# Patient Record
Sex: FEMALE | Race: WHITE | NOT HISPANIC OR LATINO | Employment: FULL TIME | URBAN - METROPOLITAN AREA
[De-identification: names, ages, dates, MRNs, and addresses within clinical notes are randomized per-mention and may not be internally consistent; named-entity substitution may affect disease eponyms.]

---

## 2017-01-01 ENCOUNTER — HOSPITAL ENCOUNTER (EMERGENCY)
Facility: HOSPITAL | Age: 37
Discharge: HOME | End: 2017-01-01
Attending: EMERGENCY MEDICINE
Payer: MEDICAID

## 2017-01-01 VITALS
DIASTOLIC BLOOD PRESSURE: 75 MMHG | TEMPERATURE: 98 F | HEIGHT: 68 IN | RESPIRATION RATE: 20 BRPM | SYSTOLIC BLOOD PRESSURE: 121 MMHG | WEIGHT: 264 LBS | OXYGEN SATURATION: 98 % | HEART RATE: 86 BPM | BODY MASS INDEX: 40.01 KG/M2

## 2017-01-01 DIAGNOSIS — M54.9 CVA TENDERNESS: ICD-10-CM

## 2017-01-01 DIAGNOSIS — Z87.440 HISTORY OF UTI: ICD-10-CM

## 2017-01-01 DIAGNOSIS — R10.9 ACUTE RIGHT FLANK PAIN: Primary | ICD-10-CM

## 2017-01-01 PROBLEM — E55.9 UNSPECIFIED VITAMIN D DEFICIENCY: Status: ACTIVE | Noted: 2017-01-01

## 2017-01-01 LAB
ALBUMIN SERPL BCP-MCNC: 3.8 G/DL
ALP SERPL-CCNC: 88 U/L
ALT SERPL W/O P-5'-P-CCNC: 18 U/L
ANION GAP SERPL CALC-SCNC: 13 MMOL/L
AST SERPL-CCNC: 17 U/L
BASOPHILS # BLD AUTO: 0.02 K/UL
BASOPHILS NFR BLD: 0.2 %
BILIRUB SERPL-MCNC: 0.3 MG/DL
BILIRUB UR QL STRIP: NEGATIVE
BUN SERPL-MCNC: 14 MG/DL
CALCIUM SERPL-MCNC: 9.4 MG/DL
CHLORIDE SERPL-SCNC: 103 MMOL/L
CLARITY UR REFRACT.AUTO: CLEAR
CO2 SERPL-SCNC: 24 MMOL/L
COLOR UR AUTO: YELLOW
CREAT SERPL-MCNC: 0.8 MG/DL
DIFFERENTIAL METHOD: ABNORMAL
EOSINOPHIL # BLD AUTO: 0.2 K/UL
EOSINOPHIL NFR BLD: 1.7 %
ERYTHROCYTE [DISTWIDTH] IN BLOOD BY AUTOMATED COUNT: 14.7 %
EST. GFR  (AFRICAN AMERICAN): >60 ML/MIN/1.73 M^2
EST. GFR  (NON AFRICAN AMERICAN): >60 ML/MIN/1.73 M^2
GLUCOSE SERPL-MCNC: 95 MG/DL
GLUCOSE UR QL STRIP: NEGATIVE
HCT VFR BLD AUTO: 44.9 %
HGB BLD-MCNC: 14.7 G/DL
HGB UR QL STRIP: NEGATIVE
KETONES UR QL STRIP: NEGATIVE
LEUKOCYTE ESTERASE UR QL STRIP: NEGATIVE
LYMPHOCYTES # BLD AUTO: 1.8 K/UL
LYMPHOCYTES NFR BLD: 19.3 %
MCH RBC QN AUTO: 28.1 PG
MCHC RBC AUTO-ENTMCNC: 32.7 %
MCV RBC AUTO: 86 FL
MONOCYTES # BLD AUTO: 0.6 K/UL
MONOCYTES NFR BLD: 6.7 %
NEUTROPHILS # BLD AUTO: 6.6 K/UL
NEUTROPHILS NFR BLD: 72 %
NITRITE UR QL STRIP: NEGATIVE
PH UR STRIP: 6 [PH] (ref 5–8)
PLATELET # BLD AUTO: 234 K/UL
PMV BLD AUTO: 12.2 FL
POTASSIUM SERPL-SCNC: 4.3 MMOL/L
PROT SERPL-MCNC: 7.5 G/DL
PROT UR QL STRIP: NEGATIVE
RBC # BLD AUTO: 5.23 M/UL
SODIUM SERPL-SCNC: 140 MMOL/L
SP GR UR STRIP: 1.02 (ref 1–1.03)
URN SPEC COLLECT METH UR: NORMAL
UROBILINOGEN UR STRIP-ACNC: NEGATIVE EU/DL
WBC # BLD AUTO: 9.09 K/UL

## 2017-01-01 PROCEDURE — 81003 URINALYSIS AUTO W/O SCOPE: CPT

## 2017-01-01 PROCEDURE — 63600175 PHARM REV CODE 636 W HCPCS: Performed by: NURSE PRACTITIONER

## 2017-01-01 PROCEDURE — 80053 COMPREHEN METABOLIC PANEL: CPT

## 2017-01-01 PROCEDURE — 99284 EMERGENCY DEPT VISIT MOD MDM: CPT | Mod: 25

## 2017-01-01 PROCEDURE — 96375 TX/PRO/DX INJ NEW DRUG ADDON: CPT

## 2017-01-01 PROCEDURE — 85025 COMPLETE CBC W/AUTO DIFF WBC: CPT

## 2017-01-01 PROCEDURE — 96374 THER/PROPH/DIAG INJ IV PUSH: CPT

## 2017-01-01 RX ORDER — ONDANSETRON 2 MG/ML
4 INJECTION INTRAMUSCULAR; INTRAVENOUS
Status: COMPLETED | OUTPATIENT
Start: 2017-01-01 | End: 2017-01-01

## 2017-01-01 RX ORDER — NAPROXEN 500 MG/1
500 TABLET ORAL 2 TIMES DAILY WITH MEALS
Qty: 30 TABLET | Refills: 0 | Status: SHIPPED | OUTPATIENT
Start: 2017-01-01 | End: 2021-01-13 | Stop reason: SDUPTHER

## 2017-01-01 RX ORDER — ONDANSETRON 8 MG/1
8 TABLET, ORALLY DISINTEGRATING ORAL
COMMUNITY
Start: 2016-12-29 | End: 2017-12-29

## 2017-01-01 RX ORDER — KETOROLAC TROMETHAMINE 30 MG/ML
30 INJECTION, SOLUTION INTRAMUSCULAR; INTRAVENOUS
Status: COMPLETED | OUTPATIENT
Start: 2017-01-01 | End: 2017-01-01

## 2017-01-01 RX ORDER — NITROFURANTOIN 25; 75 MG/1; MG/1
100 CAPSULE ORAL
COMMUNITY
Start: 2016-12-29 | End: 2017-01-05

## 2017-01-01 RX ADMIN — ONDANSETRON 4 MG: 2 INJECTION INTRAMUSCULAR; INTRAVENOUS at 03:01

## 2017-01-01 RX ADMIN — KETOROLAC TROMETHAMINE 30 MG: 30 INJECTION, SOLUTION INTRAMUSCULAR at 03:01

## 2017-01-01 NOTE — DISCHARGE INSTRUCTIONS
Flank Pain, Uncertain Cause  The flank is the area between your upper abdomen and your back. Pain there is often caused by a problem with your kidneys. It might be a kidney infection or a kidney stone. Other causes of flank pain include spinal arthritis, a pinched nerve from a back injury, or a back muscle strain or spasm.  The cause of your flank pain is not certain. You may need other tests.  Home care  Follow these tips when caring for yourself at home:  · You may use acetaminophen or ibuprofen to control pain, unless your health care provider prescribed another medicine Note: If you have chronic liver or kidney disease, talk with your provider before taking these medicines. Also talk with your provider first if youve had a stomach ulcer or GI bleeding.  · If the cause of your pain is coming from the muscles, you may get relief with ice or heat: Ice: During the first 2 days after the injury, put an ice pack on the painful area for 20 minutes every 2 to 4 hours. This will reduce swelling and pain. A hot shower, hot bath, or heating pad works well for muscle spasm. You can start with ice, then switch to heat after 2 days. You might find that alternating ice and heat works well. Use the method that feels the best to you.  Follow-up care  Follow up with your health care provider. if your symptoms dont get better over the next few days.  When to seek medical advice  Call your health care provider right away if any of these happen:  · Repeated vomiting  · Fever of 100.4ºF (38ºC) or higher, or as directed by your health care provider  · Flank pain that gets worse  · Pain that spreads to the front of your belly (abdomen)  · Dizziness, weakness, or fainting  · Blood in your urine  · Burning feeling when you urinate or the need to urinate often  · Pain in one of your legs that gets worse  · Numbness or weakness in a leg  © 0347-8033 [employer]. [address] [place] [address], [place] [address]. All rights  reserved. This information is not intended as a substitute for professional medical care. Always follow your healthcare professional's instructions.

## 2017-01-01 NOTE — ED AVS SNAPSHOT
[facility]  [address] [address] [address]               Juanita Tran   2017  2:13 PM   ED    Description:  Female : 1980   Department:  [facility]           Your Care was Coordinated By:     Provider Role From To    Dioni Kevin NP Nurse Practitioner 17 1414 --      Reason for Visit     Flank Pain           Diagnoses this Visit        Comments    Acute right flank pain    -  Primary     History of UTI     Currently being treated with Macrobid    CVA tenderness           ED Disposition     ED Disposition Condition Comment    Discharge             To Do List           Follow-up Information     Schedule an appointment as soon as possible for a visit with SILVA Lind.    Specialty:  Family Medicine    Contact information:    [address]  [facility] [place] [address] [phone]         These Medications        Disp Refills Start End    naproxen (NAPROSYN) 500 MG tablet 30 tablet 0 2017     Take 1 tablet (500 mg total) by mouth 2 (two) times daily with meals. - Oral    Pharmacy: [facility] [id number] [place] [address] [facility] [address] [facility] [address] Ph #: [phone]         [facility]     [facility] -  Assistance  Registered nurses in the [facility] provide clinical advisement, health education, appointment booking, and other advisory services.  Call for this free service at [phone].             Medications           Message regarding Medications     Verify the changes and/or additions to your medication regime listed below are the same as discussed with your clinician today.  If any of these changes or additions are incorrect, please notify your healthcare provider.        START taking these NEW medications        Refills    naproxen (NAPROSYN) 500 MG tablet 0    Sig: Take 1 tablet (500 mg total) by mouth  "2 (two) times daily with meals.    Class: Print    Route: Oral      These medications were administered today        Dose Freq    ketorolac injection 30 mg 30 mg ED 1 Time    Sig: Inject 30 mg into the vein ED 1 Time.    Class: Normal    Route: Intravenous    ondansetron injection 4 mg 4 mg ED 1 Time    Sig: Inject 4 mg into the vein ED 1 Time.    Class: Normal    Route: Intravenous           Verify that the below list of medications is an accurate representation of the medications you are currently taking.  If none reported, the list may be blank. If incorrect, please contact your healthcare provider. Carry this list with you in case of emergency.           Current Medications     cyclobenzaprine (FLEXERIL) 10 MG tablet Take 10 mg by mouth 3 (three) times daily as needed for Muscle spasms.    ergocalciferol (ERGOCALCIFEROL) 50,000 unit Cap Take 50,000 Units by mouth every 7 days.    folic acid (FOLVITE) 1 MG tablet Take 1 mg by mouth once daily.    metformin (GLUCOPHAGE XR) 500 MG 24 hr tablet Take 1 po q d with evening meal for 2 weeks then 2 po q d with meal    nitrofurantoin, macrocrystal-monohydrate, (MACROBID) 100 MG capsule Take 100 mg by mouth.    omeprazole (PRILOSEC) 40 MG capsule TK 1 C PO DAILY  WITH MEALS    ondansetron (ZOFRAN-ODT) 8 MG TbDL Take 8 mg by mouth.    ranitidine (ZANTAC) 300 MG tablet Take 300 mg by mouth every evening.    SAVELLA 12.5 mg (5)-25 mg(8)-50 mg(42) DsPk TAKE AS DIRECTRED    tobramycin-dexamethasone 0.3-0.1% (TOBRADEX) 0.3-0.1 % DrpS INSTILL 1 DROP IN THE RIGHT EYE 4 TIMES A DAY    naproxen (NAPROSYN) 500 MG tablet Take 1 tablet (500 mg total) by mouth 2 (two) times daily with meals.           Clinical Reference Information           Your Vitals Were     BP Pulse Temp Resp Height Weight    153/96 (BP Location: Left arm, Patient Position: Sitting) 99 98.7 °F (37.1 °C) (Oral) 20 5' 8" (1.727 m) 119.7 kg (264 lb)    Last Period SpO2 BMI          12/22/2016 (Exact Date) 97% 40.14 " kg/m2        Allergies as of 1/1/2017        Reactions    Azithromycin Swelling    Sulfa (Sulfonamide Antibiotics) Swelling    Imitrex [Sumatriptan]     Penicillins     Prednisone       Immunizations Administered on Date of Encounter - 1/1/2017     None      ED Micro, Lab, POCT     Start Ordered       Status Ordering Provider    01/01/17 1509 01/01/17 1509  CBC auto differential  STAT      Final result     01/01/17 1509 01/01/17 1509  Comprehensive metabolic panel  STAT      Final result     01/01/17 1448 01/01/17 1447  Urinalysis  STAT      Final result       ED Imaging Orders     Start Ordered       Status Ordering Provider    01/01/17 1510 01/01/17 1509  CT Renal Stone Study ABD Pelvis WO  1 time imaging      Final result         Discharge Instructions         Flank Pain, Uncertain Cause  The flank is the area between your upper abdomen and your back. Pain there is often caused by a problem with your kidneys. It might be a kidney infection or a kidney stone. Other causes of flank pain include spinal arthritis, a pinched nerve from a back injury, or a back muscle strain or spasm.  The cause of your flank pain is not certain. You may need other tests.  Home care  Follow these tips when caring for yourself at home:  · You may use acetaminophen or ibuprofen to control pain, unless your health care provider prescribed another medicine Note: If you have chronic liver or kidney disease, talk with your provider before taking these medicines. Also talk with your provider first if youve had a stomach ulcer or GI bleeding.  · If the cause of your pain is coming from the muscles, you may get relief with ice or heat: Ice: During the first 2 days after the injury, put an ice pack on the painful area for 20 minutes every 2 to 4 hours. This will reduce swelling and pain. A hot shower, hot bath, or heating pad works well for muscle spasm. You can start with ice, then switch to heat after 2 days. You might find that alternating  ice and heat works well. Use the method that feels the best to you.  Follow-up care  Follow up with your health care provider. if your symptoms dont get better over the next few days.  When to seek medical advice  Call your health care provider right away if any of these happen:  · Repeated vomiting  · Fever of 100.4ºF (38ºC) or higher, or as directed by your health care provider  · Flank pain that gets worse  · Pain that spreads to the front of your belly (abdomen)  · Dizziness, weakness, or fainting  · Blood in your urine  · Burning feeling when you urinate or the need to urinate often  · Pain in one of your legs that gets worse  · Numbness or weakness in a leg  © 5338-4338 [employer]. [address] [place] [address], [place] [address]. All rights reserved. This information is not intended as a substitute for professional medical care. Always follow your healthcare professional's instructions.          Your Scheduled Appointments     Jan 16, 2017  9:00 AM CST   Established Patient Visit with Kofi Nuno MD   [facility] ([address] [address] [phone]              Smoking Cessation     If you would like to quit smoking:   You may be eligible for free services if you are a [place] resident and started smoking cigarettes before September 1, 1988.  Call the Smoking Cessation Trust (SCT) toll free at (664) 381-6089 or (142) 312-6822.   Call 1-800-QUIT-NOW if you do not meet the above criteria.             [facility] complies with applicable Federal civil rights laws and does not discriminate on the basis of race, color, national origin, age, disability, or sex.        Language Assistance Services     ATTENTION: Language assistance services are available, free of charge. Please call [phone].      ATENCIÓN: Si habla daleañol, tiene a de dios disposición servicios gratuitos de asistencia lingüística. Llame al [phone].     CHÚ Ý:  N?u b?n nói Ti?ng Vi?t, có các d?ch v? h? tr? ngôn ng? mi?n phí clayh cho b?n. G?i s? [phone].

## 2017-01-01 NOTE — ED PROVIDER NOTES
"Encounter Date: 1/1/2017       History     Chief Complaint   Patient presents with    Flank Pain     right. dx with kidney infection on thursday by Dr Mccartney. pain worse      Review of patient's allergies indicates:   Allergen Reactions    Azithromycin Swelling    Sulfa (sulfonamide antibiotics) Swelling    Imitrex [sumatriptan]     Penicillins     Prednisone      Patient is a 36 y.o. female presenting with the following complaint: abdominal pain. The history is provided by the patient.   Abdominal Pain   The current episode started several days ago. The problem has been gradually worsening. The abdominal pain is located in the right flank. The abdominal pain does not radiate. The severity of the abdominal pain is 6/10. The abdominal pain is relieved by certain positions. The other symptoms of the illness do not include fever, fatigue, jaundice, shortness of breath, nausea, vomiting, diarrhea or dysuria.   Additional symptoms associated with the illness include frequency (currently being treated for a bladder infection) and back pain. Symptoms associated with the illness do not include chills or anorexia. Significant associated medical issues include GERD. Significant associated medical issues do not include gallstones, liver disease or cardiac disease.   Ms. Tran presents to the emergency department with complaints of pain to her right flank region.  She states that she was seen by her primary care provider, Anson Awad MD, on Thursday and diagnosed with a "kidney infection" and prescribed Macrobid.  The patient states that her pain has since worsened since beginning the antibiotics.  She denies any nausea, vomiting, diarrhea, or any further complaints.         PCP:   SILVA Lind        Past Medical History   Diagnosis Date    Environmental allergies     Fibromyalgia     GERD (gastroesophageal reflux disease)     Insulin resistance     Osteoarthritis     Sciatica      Past Medical " History Pertinent Negatives   Diagnosis Date Noted    Abnormal Pap smear of cervix 12/19/2016     Past Surgical History   Procedure Laterality Date    [place] tooth extraction      Ectopic pregnancy surgery       Family History   Problem Relation Age of Onset    Thrombosis Maternal Grandmother      DVT    Colon cancer Maternal Grandfather     Thrombosis Mother      DVT    Breast cancer Neg Hx     Ovarian cancer Neg Hx      Social History   Substance Use Topics    Smoking status: Current Every Day Smoker     Packs/day: 1.00     Years: 20.00     Types: Cigarettes    Smokeless tobacco: Never Used    Alcohol use Yes      Comment: rarely     Review of Systems   Constitutional: Negative for chills, fatigue and fever.   HENT: Negative for sore throat.    Eyes: Negative for visual disturbance.   Respiratory: Negative for cough, chest tightness, shortness of breath and wheezing.    Cardiovascular: Negative for chest pain.   Gastrointestinal: Positive for abdominal pain. Negative for anorexia, diarrhea, jaundice, nausea and vomiting.   Genitourinary: Positive for flank pain (right) and frequency (currently being treated for a bladder infection). Negative for dysuria.   Musculoskeletal: Positive for back pain.   Skin: Negative for rash.   Neurological: Negative for dizziness, weakness and headaches.   Hematological: Does not bruise/bleed easily.       Physical Exam   Initial Vitals   BP Pulse Resp Temp SpO2   01/01/17 1410 01/01/17 1410 01/01/17 1410 01/01/17 1410 01/01/17 1410   153/96 99 20 98.7 °F (37.1 °C) 97 %     Physical Exam    Nursing note and vitals reviewed.  Constitutional: She appears well-developed and well-nourished. She is Obese . She is cooperative. She does not appear ill. No distress.   HENT:   Head: Normocephalic and atraumatic.   Nose: Nose normal.   Mouth/Throat: Uvula is midline, oropharynx is clear and moist and mucous membranes are normal.   Eyes: Conjunctivae, EOM and lids are normal. Pupils  are equal, round, and reactive to light.   Neck: Trachea normal and normal range of motion. Neck supple.   Cardiovascular: Normal rate, regular rhythm, intact distal pulses and normal pulses.   Pulmonary/Chest: Effort normal and breath sounds normal. No respiratory distress. She has no wheezes. She has no rhonchi. She has no rales.   Abdominal: Soft. Bowel sounds are normal. She exhibits no distension and no mass. There is tenderness (right flank) in the right lower quadrant. There is CVA tenderness (right). There is no rebound and no guarding.   Musculoskeletal: Normal range of motion. She exhibits no edema or tenderness.   Neurological: She is alert and oriented to person, place, and time. She has normal strength. No cranial nerve deficit or sensory deficit. GCS eye subscore is 4. GCS verbal subscore is 5. GCS motor subscore is 6.   Neurovascular intact to all extremities. Normal gait.    Skin: Skin is warm, dry and intact. No rash noted.   Normal color and turgor.    Psychiatric: She has a normal mood and affect. Her speech is normal and behavior is normal. Judgment and thought content normal. Cognition and memory are normal.         ED Course   Procedures     ED Abnormal Lab Results:   Labs Reviewed   CBC W/ AUTO DIFFERENTIAL - Abnormal; Notable for the following:        Result Value    RDW 14.7 (*)     All other components within normal limits   URINALYSIS   COMPREHENSIVE METABOLIC PANEL       ED Lab Results:   Results for orders placed or performed during the hospital encounter of 01/01/17   Urinalysis   Result Value Ref Range    Specimen UA Urine, Clean Catch     Color, UA Yellow Yellow, Straw, [place]    Appearance, UA Clear Clear    pH, UA 6.0 5.0 - 8.0    Specific Gravity, UA 1.025 1.005 - 1.030    Protein, UA Negative Negative    Glucose, UA Negative Negative    Ketones, UA Negative Negative    Bilirubin (UA) Negative Negative    Occult Blood UA Negative Negative    Nitrite, UA Negative Negative     Urobilinogen, UA Negative <2.0 EU/dL    Leukocytes, UA Negative Negative   CBC auto differential   Result Value Ref Range    WBC 9.09 3.90 - 12.70 K/uL    RBC 5.23 4.00 - 5.40 M/uL    Hemoglobin 14.7 12.0 - 16.0 g/dL    Hematocrit 44.9 37.0 - 48.5 %    MCV 86 82 - 98 fL    MCH 28.1 27.0 - 31.0 pg    MCHC 32.7 32.0 - 36.0 %    RDW 14.7 (H) 11.5 - 14.5 %    Platelets 234 150 - 350 K/uL    MPV 12.2 9.2 - 12.9 fL    Gran # 6.6 1.8 - 7.7 K/uL    Lymph # 1.8 1.0 - 4.8 K/uL    Mono # 0.6 0.3 - 1.0 K/uL    Eos # 0.2 0.0 - 0.5 K/uL    Baso # 0.02 0.00 - 0.20 K/uL    Gran% 72.0 38.0 - 73.0 %    Lymph% 19.3 18.0 - 48.0 %    Mono% 6.7 4.0 - 15.0 %    Eosinophil% 1.7 0.0 - 8.0 %    Basophil% 0.2 0.0 - 1.9 %    Differential Method Automated    Comprehensive metabolic panel   Result Value Ref Range    Sodium 140 136 - 145 mmol/L    Potassium 4.3 3.5 - 5.1 mmol/L    Chloride 103 95 - 110 mmol/L    CO2 24 23 - 29 mmol/L    Glucose 95 70 - 110 mg/dL    BUN, Bld 14 6 - 20 mg/dL    Creatinine 0.8 0.5 - 1.4 mg/dL    Calcium 9.4 8.7 - 10.5 mg/dL    Total Protein 7.5 6.0 - 8.4 g/dL    Albumin 3.8 3.5 - 5.2 g/dL    Total Bilirubin 0.3 0.1 - 1.0 mg/dL    Alkaline Phosphatase 88 55 - 135 U/L    AST 17 10 - 40 U/L    ALT 18 10 - 44 U/L    Anion Gap 13 8 - 16 mmol/L    eGFR if African American >60.0 >60 mL/min/1.73 m^2    eGFR if non African American >60.0 >60 mL/min/1.73 m^2         ED Imaging Results:   Imaging Results         CT Renal Stone Study ABD Pelvis WO (Final result) Result time:  01/01/17 15:53:37    Final result by Willian Tomlinson MD (01/01/17 15:53:37)    Impression:        Negative for acute inflammatory process of the abdomen or pelvis.      All CT scans at this facility use dose modulation, iterative reconstruction and/or weight based dosing when appropriate to reduced radiation dose to as low as reasonably achievable.        Electronically signed by: WILLIAN TOMLINSON MD  Date:     01/01/17  Time:    15:53      "Narrative:    The EXAM:CT RENAL STONE STUDY ABD PELVIS WO 01/01/17 15:39:00      HISTORY: Right flank pain.    TECHNIQUE: Standard axial imaging performed with reformatted sagittal and coronal images.       COMPARISON: None      FINDINGS:    Lung bases are clear.    The liver is normal.  Gallbladder is normal.    The spleen, pancreas and adrenal glands are normal.  Aorta and IVC are normal in appearance.    The stomach and small intestine are normal.  The appendix is normal.  The colon is normal.  The rectum is normal.    Uterus is unremarkable.  Adnexal regions are normal.    Regional bones are unremarkable.                ED Medications:   Medications   ketorolac injection 30 mg (30 mg Intravenous Given 1/1/17 1519)   ondansetron injection 4 mg (4 mg Intravenous Given 1/1/17 1548)       ED Vital Signs:  Vitals:    01/01/17 1410 01/01/17 1613   BP: (!) 153/96 121/75   Pulse: 99 86   Resp: 20    Temp: 98.7 °F (37.1 °C) 98.2 °F (36.8 °C)   TempSrc: Oral Oral   SpO2: 97% 98%   Weight: 119.7 kg (264 lb)    Height: 5' 8" (1.727 m)        1610 HOURS RE-EVALUATION & DISPOSITION:   Reassessment at the time of disposition demonstrates that the patient is resting comfortably in no acute distress. Patient denies any nausea at this time and rates her pain as a 4/10.  She has remained hemodynamically stable throughout the entire ED visit and is without objective evidence for acute process requiring urgent intervention or hospitalization. I discussed test results and provided counseling to patient with regard to condition, the treatment plan, specific conditions for return, and the importance of follow up.  Answered questions at this time. The patient is stable for discharge.  I recommended that the patient follow up with her primary care provider this week and schedule a routine OB/GYN exam for further evaluation and treatment.             Medical Decision Making:   History:   Old Records Summarized: records from clinic " visits.  Clinical Tests:   Lab Tests: Ordered and Reviewed  Radiological Study: Ordered and Reviewed                     Clinical Impression:       ICD-10-CM ICD-9-CM   1. Acute right flank pain R10.11 789.09     338.19   2. History of UTI (Currently being treated with Macrobid) Z87.440 V13.02   3. CVA tenderness M54.9 724.5         Disposition:   Disposition: Discharged  Condition: Stable  I discussed with patient that the evaluation in the emergency department does not suggest any emergent or life threatening medical condition requiring immediate intervention beyond what was provided in the ED, and I believe patient is safe for discharge.  Regardless, an unremarkable evaluation in the ED does not preclude the development or presence of a serious of life threatening condition. As such, patient was instructed to return immediately for any worsening or change in current symptoms. I also discussed the results of my evaluation and diagnosis with patient and she concurs with the evaluation and management plan.  Detailed written and verbal instructions provided to patient and she expressed a verbal understanding of the discharge instructions and overall management plan. Reiterated the importance of medication administration and safety and advised patient to follow up with primary care provider in 3-5 days or sooner if needed.  Also advised patient to return to the ER for any complications.     Regarding FLANK PAIN, I recommended that the patient: Sip water or other clear fluids; avoid solid food for the first few hours after vomiting or diarrhea; if vomiting, wait 6 hours, and then eat small amounts of mild foods such as rice, applesauce, or crackers; avoid dairy products; avoid citrus, high-fat foods, fried or greasy foods, tomato products, caffeine, alcohol, and carbonated beverages;  avoid aspirin, ibuprofen or other anti-inflammatory medications, and narcotic pain medications unless prescribed.  In regards to  prevention, I encouraged patient to:  Avoid fatty or greasy foods; drink plenty of water each day; eat small meals more frequently; exercise regularly; limit foods that produce gas; make sure meals are well-balanced and high in fiber and include plenty of fruits and vegetables.      Discharge Medication List as of 1/1/2017  4:09 PM      START taking these medications    Details   naproxen (NAPROSYN) 500 MG tablet Take 1 tablet (500 mg total) by mouth 2 (two) times daily with meals., Starting 1/1/2017, Until Discontinued, Print             Follow-up Information     Schedule an appointment as soon as possible for a visit with SILVA Lind.    Specialty:  Family Medicine    Contact information:    [address]  [facility] [place] [address] [phone]               Dioni Kevin NP  01/03/17 1795

## 2019-12-08 ENCOUNTER — HOSPITAL ENCOUNTER (EMERGENCY)
Facility: HOSPITAL | Age: 39
Discharge: HOME | End: 2019-12-08
Attending: EMERGENCY MEDICINE
Payer: MEDICAID

## 2019-12-08 VITALS
BODY MASS INDEX: 38.08 KG/M2 | DIASTOLIC BLOOD PRESSURE: 91 MMHG | WEIGHT: 250.44 LBS | TEMPERATURE: 98 F | RESPIRATION RATE: 19 BRPM | SYSTOLIC BLOOD PRESSURE: 132 MMHG | OXYGEN SATURATION: 98 % | HEART RATE: 105 BPM

## 2019-12-08 DIAGNOSIS — L03.115 CELLULITIS OF RIGHT LOWER EXTREMITY: Primary | ICD-10-CM

## 2019-12-08 PROCEDURE — 25000003 PHARM REV CODE 250: Mod: ER | Performed by: EMERGENCY MEDICINE

## 2019-12-08 PROCEDURE — 99283 EMERGENCY DEPT VISIT LOW MDM: CPT | Mod: ER

## 2019-12-08 RX ORDER — CLINDAMYCIN HYDROCHLORIDE 150 MG/1
300 CAPSULE ORAL
Status: COMPLETED | OUTPATIENT
Start: 2019-12-08 | End: 2019-12-08

## 2019-12-08 RX ORDER — CLINDAMYCIN HYDROCHLORIDE 150 MG/1
300 CAPSULE ORAL 4 TIMES DAILY
Qty: 56 CAPSULE | Refills: 0 | Status: SHIPPED | OUTPATIENT
Start: 2019-12-08 | End: 2019-12-15

## 2019-12-08 RX ORDER — CLINDAMYCIN HYDROCHLORIDE 150 MG/1
300 CAPSULE ORAL 4 TIMES DAILY
Qty: 56 CAPSULE | Refills: 0 | Status: SHIPPED | OUTPATIENT
Start: 2019-12-08 | End: 2019-12-08 | Stop reason: SDUPTHER

## 2019-12-08 RX ADMIN — CLINDAMYCIN HYDROCHLORIDE 300 MG: 150 CAPSULE ORAL at 12:12

## 2019-12-08 NOTE — ED NOTES
LOC: The patient is awake, alert and aware of environment with an appropriate affect, the patient is oriented x 3 and speaking appropriately.  APPEARANCE: Patient resting comfortably and in no acute distress, patient is clean and well groomed, patient's clothing is properly fastened.  HEENT: Brief WNL  SKIN: Brief WNL except redness to inner groin area  MUSCULOSKELETAL: Brief WNL  RESPIRATORY: Brief WNL  CARDIAC: Brief WNL  GASTRO: Brief WNL  : Brief WNL  Peripheral Vasc: Brief WNL  NEURO: Brief WNL  PSYCH: Brief WNL

## 2019-12-09 NOTE — ED PROVIDER NOTES
Encounter Date: 12/7/2019       History     Chief Complaint   Patient presents with    Abscess     c/o abscess on right inner leg onset yesterday     Patient presents for evaluation of a cutaneous infection. Lesion is located in the medial right thigh. Onset was noted about a week ago. Symptoms have gradually worsened. Patient does not have previous history of cutaneous abscesses. Patient does not have diabetes.        Review of patient's allergies indicates:   Allergen Reactions    Azithromycin Swelling    Sulfa (sulfonamide antibiotics) Swelling    Imitrex [sumatriptan]     Penicillins     Prednisone      Past Medical History:   Diagnosis Date    Environmental allergies     Fibromyalgia     GERD (gastroesophageal reflux disease)     Insulin resistance     Osteoarthritis     Rheumatoid arteritis     Sciatica      Past Surgical History:   Procedure Laterality Date    ECTOPIC PREGNANCY SURGERY      WISDOM TOOTH EXTRACTION       Family History   Problem Relation Age of Onset    Thrombosis Maternal Grandmother         DVT    Colon cancer Maternal Grandfather     Thrombosis Mother         DVT    Breast cancer Neg Hx     Ovarian cancer Neg Hx      Social History     Tobacco Use    Smoking status: Current Every Day Smoker     Packs/day: 1.00     Years: 20.00     Pack years: 20.00     Types: Cigarettes    Smokeless tobacco: Never Used   Substance Use Topics    Alcohol use: Yes     Comment: rarely    Drug use: No     Review of Systems   Constitutional: Negative for chills and fever.   HENT: Negative for congestion and rhinorrhea.    Respiratory: Negative for cough, chest tightness, shortness of breath and wheezing.    Cardiovascular: Negative for chest pain, palpitations and leg swelling.   Gastrointestinal: Negative for abdominal pain, constipation, diarrhea, nausea and vomiting.   Genitourinary: Negative for dysuria, frequency, urgency, vaginal bleeding and vaginal discharge.   Skin: Negative for  color change and rash.   Allergic/Immunologic: Negative for immunocompromised state.   Neurological: Negative for dizziness, weakness and numbness.   Hematological: Negative for adenopathy. Does not bruise/bleed easily.   All other systems reviewed and are negative.      Physical Exam     Initial Vitals [12/08/19 0004]   BP Pulse Resp Temp SpO2   (!) 132/91 105 19 98.3 °F (36.8 °C) 98 %      MAP       --         Physical Exam    Nursing note and vitals reviewed.  Constitutional: She appears well-developed and well-nourished. She is not diaphoretic. No distress.   HENT:   Head: Normocephalic and atraumatic.   Cardiovascular: Normal rate, regular rhythm, normal heart sounds and intact distal pulses.   Pulmonary/Chest: Breath sounds normal. No respiratory distress.   Neurological: She is alert and oriented to person, place, and time. She has normal strength.   Skin: Skin is warm and dry.        Bedside US fails to identify any signs of a fluid collection         ED Course   Procedures  Labs Reviewed - No data to display       Imaging Results    None          Medical Decision Making:   ED Management:  All findings were reviewed with the patient/family in detail.  I see no indication of an emergent process beyond that addressed during our encounter but have duly counseled the patient/family regarding the need for prompt follow-up as well as the indications that should prompt immediate return to the emergency room should new or worrisome developments occur.  The patient has additionally been provided with printed information regarding diagnosis as well as instructions regarding follow up and any medications intended to manage the patient's aforementioned conditions.  The patient/family communicates understanding of all this information and all remaining questions and concerns were addressed at this time.                                       Clinical Impression:       ICD-10-CM ICD-9-CM   1. Cellulitis of right lower  extremity L03.115 682.6                             Constantine Jimenez MD  12/09/19 0239

## 2021-01-13 ENCOUNTER — HOSPITAL ENCOUNTER (EMERGENCY)
Facility: HOSPITAL | Age: 41
Discharge: HOME | End: 2021-01-13
Attending: EMERGENCY MEDICINE
Payer: MEDICAID

## 2021-01-13 VITALS
RESPIRATION RATE: 19 BRPM | TEMPERATURE: 99 F | SYSTOLIC BLOOD PRESSURE: 134 MMHG | BODY MASS INDEX: 37.25 KG/M2 | OXYGEN SATURATION: 96 % | DIASTOLIC BLOOD PRESSURE: 88 MMHG | WEIGHT: 245 LBS | HEART RATE: 85 BPM

## 2021-01-13 DIAGNOSIS — S80.01XA CONTUSION OF RIGHT KNEE, INITIAL ENCOUNTER: ICD-10-CM
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
## 2021-01-13 DIAGNOSIS — S93.402A SPRAIN OF LEFT ANKLE, UNSPECIFIED LIGAMENT, INITIAL ENCOUNTER: Primary | ICD-10-CM

## 2021-01-13 PROCEDURE — 99284 EMERGENCY DEPT VISIT MOD MDM: CPT | Mod: 25,ER

## 2021-01-13 RX ORDER — ORPHENADRINE CITRATE 100 MG/1
100 TABLET, EXTENDED RELEASE ORAL 2 TIMES DAILY
Qty: 12 TABLET | Refills: 0 | Status: SHIPPED | OUTPATIENT
Start: 2021-01-13

## 2021-01-13 RX ORDER — NAPROXEN 500 MG/1
500 TABLET ORAL 2 TIMES DAILY WITH MEALS
Qty: 12 TABLET | Refills: 0 | OUTPATIENT
Start: 2021-01-13 | End: 2022-05-18

## 2021-04-28 ENCOUNTER — PATIENT MESSAGE (OUTPATIENT)
Dept: RESEARCH | Facility: HOSPITAL | Age: 41
End: 2021-04-28

## 2022-01-29 ENCOUNTER — HOSPITAL ENCOUNTER (EMERGENCY)
Facility: HOSPITAL | Age: 42
Discharge: HOME | End: 2022-01-29
Attending: EMERGENCY MEDICINE
Payer: MEDICAID

## 2022-01-29 VITALS
BODY MASS INDEX: 41.9 KG/M2 | OXYGEN SATURATION: 98 % | DIASTOLIC BLOOD PRESSURE: 93 MMHG | WEIGHT: 275.56 LBS | HEART RATE: 102 BPM | RESPIRATION RATE: 16 BRPM | SYSTOLIC BLOOD PRESSURE: 163 MMHG | TEMPERATURE: 99 F

## 2022-01-29 DIAGNOSIS — U07.1 COVID-19 VIRUS DETECTED: Primary | ICD-10-CM

## 2022-01-29 LAB
CTP QC/QA: YES
CTP QC/QA: YES
POC MOLECULAR INFLUENZA A AGN: NEGATIVE
POC MOLECULAR INFLUENZA B AGN: NEGATIVE
SARS-COV-2 RDRP RESP QL NAA+PROBE: POSITIVE

## 2022-01-29 PROCEDURE — 99282 EMERGENCY DEPT VISIT SF MDM: CPT | Mod: 25,ER

## 2022-01-29 PROCEDURE — U0002 COVID-19 LAB TEST NON-CDC: HCPCS | Mod: ER | Performed by: NURSE PRACTITIONER

## 2022-01-29 NOTE — ED PROVIDER NOTES
Encounter Date: 1/29/2022       History     Chief Complaint   Patient presents with    bodyaches     Bodyaches, headache since Thursday.      Juanita Tran is a 41 y.o. female with medical history of hypertension,fibromyalgia, rheumatoid arthritis, osteoarthritis, insulin resistance, sciatica presents to ED with complaint of body aches over the past 3 days. She noticed mild elevation of bp on thrusday at onset of body aches Thursday. She has covid concerns and would like testing. No fever, no increased cough, patient is cigarette smoker, has same occasional cough. No wheezing. No shortness of breath.           Review of patient's allergies indicates:   Allergen Reactions    Azithromycin Swelling    Sulfa (sulfonamide antibiotics) Swelling    Imitrex [sumatriptan]     Penicillins     Prednisone      Past Medical History:   Diagnosis Date    Environmental allergies     Fibromyalgia     GERD (gastroesophageal reflux disease)     Insulin resistance     Osteoarthritis     Rheumatoid arteritis     Sciatica      Past Surgical History:   Procedure Laterality Date    ECTOPIC PREGNANCY SURGERY      WISDOM TOOTH EXTRACTION       Family History   Problem Relation Age of Onset    Thrombosis Maternal Grandmother         DVT    Colon cancer Maternal Grandfather     Thrombosis Mother         DVT    Breast cancer Neg Hx     Ovarian cancer Neg Hx      Social History     Tobacco Use    Smoking status: Current Every Day Smoker     Packs/day: 1.00     Years: 20.00     Pack years: 20.00     Types: Cigarettes    Smokeless tobacco: Never Used   Substance Use Topics    Alcohol use: Yes     Comment: rarely    Drug use: No     Review of Systems   Constitutional: Positive for activity change, appetite change, chills and fatigue. Negative for fever.   HENT: Negative for congestion, postnasal drip, rhinorrhea, sinus pressure, sinus pain, sneezing, sore throat and voice change.    Eyes: Negative.    Respiratory: Negative  for cough, choking, chest tightness, shortness of breath and wheezing.    Cardiovascular: Negative for chest pain, palpitations and leg swelling.   Gastrointestinal: Negative for abdominal distention, abdominal pain, constipation, diarrhea, nausea and vomiting.   Endocrine: Negative for cold intolerance and heat intolerance.   Genitourinary: Negative.    Musculoskeletal: Positive for myalgias. Negative for arthralgias, back pain, gait problem, joint swelling, neck pain and neck stiffness.   Skin: Negative.    Allergic/Immunologic: Negative for environmental allergies and food allergies.   Neurological: Positive for headaches. Negative for dizziness, facial asymmetry and light-headedness.   All other systems reviewed and are negative.      Physical Exam     Initial Vitals [01/29/22 1232]   BP Pulse Resp Temp SpO2   (!) 163/93 102 16 99.2 °F (37.3 °C) 98 %      MAP       --         Physical Exam    Nursing note and vitals reviewed.  Constitutional: She appears well-developed and well-nourished. She is not diaphoretic. She is Obese . She is active and cooperative.  Non-toxic appearance. She does not have a sickly appearance. She does not appear ill. No distress.   HENT:   Head: Normocephalic and atraumatic.   Right Ear: External ear normal.   Left Ear: External ear normal.   Nose: Nose normal.   Mouth/Throat: Oropharynx is clear and moist. No oropharyngeal exudate.   Eyes: Conjunctivae are normal.   Neck: Neck supple.   Normal range of motion.  Cardiovascular: Normal rate, regular rhythm, normal heart sounds and intact distal pulses.   Pulmonary/Chest: Breath sounds normal. No respiratory distress. She has no wheezes. She has no rhonchi. She has no rales.   Abdominal: Abdomen is soft.   Musculoskeletal:         General: Normal range of motion.      Cervical back: Normal range of motion and neck supple.     Neurological: She is alert and oriented to person, place, and time. She has normal strength and normal reflexes.    Skin: Skin is warm and dry. Capillary refill takes less than 2 seconds.   Psychiatric: She has a normal mood and affect. Her behavior is normal. Thought content normal.         ED Course   Procedures  Labs Reviewed   SARS-COV-2 RDRP GENE - Abnormal; Notable for the following components:       Result Value    POC Rapid COVID Positive (*)     All other components within normal limits   POCT INFLUENZA A/B MOLECULAR          Imaging Results    None          Medications - No data to display              ED Course as of 01/29/22 1426   Sat Jan 29, 2022   1358 Covid risk score: 1    2:00 PM Reassessed patient at this time.  Pt states condition has improved. Discussed with pt all pertinent ED information and results. Discussed pt dx and plan of tx. Gave patient or family all follow up and return to the ED instructions. All questions and concerns were addressed at this time. Patient or family express understanding of information and instructions, and is comfortable with plan to discharge. Patient is stable for discharge.         [CG]      ED Course User Index  [CG] Kaylah Reynolds NP             Clinical Impression:   Final diagnoses:  [U07.1] COVID-19 virus detected (Primary)          ED Disposition Condition    Discharge Stable        ED Prescriptions     None        Follow-up Information     Follow up With Specialties Details Why Contact Info    [facility]  As needed [address]  [facility] [place] [address] [phone]      [facility] - Emergency Dept Emergency Medicine  If symptoms worsen [address]  [facility] [address] [phone]           Kaylah Reynolds NP  01/29/22 1426

## 2022-01-29 NOTE — DISCHARGE INSTRUCTIONS
Your test was POSITIVE for COVID-19 (coronavirus).       Please isolate yourself at home over next 5 days.  You may leave home and/or return to work once the following conditions are met:    If you were not hospitalized and are not moderately to severely immunocompromised:   More than 5 days since symptoms first appeared AND  More than 24 hours fever free without medications AND  Symptoms are improving  Continue to wear a mask around others for 5 additional days.    If you were hospitalized OR are moderately to severely immunocompromised:  More than 20 days since symptoms first appeared  More than 24 hours fever free without medications  Symptoms have improved    If you had no symptoms but tested positive:  More than 5 days since the date of the first positive test (20 days if moderately to severely immunocompromised). If you develop symptoms, then use the guidelines above.  Continue to wear a mask around others for 5 additional days.      Contact Tracing    As one of the next steps, you will receive a call or text from the [place] Department of Health ([facility]) COVID-19 Tracing Team. See the contact information below so you know not to ignore the health departments call. It is important that you contact them back immediately so they can help.      Contact Tracer Number:  [phone]  Caller ID for most carriers: [facility]     What is contact tracing?  Contact tracing is a process that helps identify everyone who has been in close contact with an infected person. Contact tracers let those people know they may have been exposed and guide them on next steps. Confidentiality is important for everyone; no one will be told who may have exposed them to the virus.  Your involvement is important. The more we know about where and how this virus is spreading, the better chance we have at stopping it from spreading further.  What does exposure mean?  Exposure means you have been within 6 feet for more than 15 minutes  with a person who has or had COVID-19.  What kind of questions do the contact tracers ask?  A contact tracer will confirm your basic contact information including name, address, phone number, and next of kin, as well as asking about any symptoms you may have had. Theyll also ask you how you think you may have gotten sick, such as places where you may have been exposed to the virus, and people you were with. Those names will never be shared with anyone outside of that call, and will only be used to help trace and stop the spread of the virus.   I have privacy concerns. How will the state use my information?  Your privacy about your health is important. All calls are completed using call centers that use the appropriate health privacy protection measures (HIPAA compliance), meaning that your patient information is safe. No one will ever ask you any questions related to immigration status. Your health comes first.   Do I have to participate?  You do not have to participate, but we strongly encourage you to. Contact tracing can help us catch and control new outbreaks as theyre developing to keep your friends and family safe.   What if I dont hear from anyone?  If you dont receive a call within 24 hours, you can call the number above right away to inquire about your status. That line is open from 8:00 am - 8:00 p.m., 7 days a week.  Contact tracing saves lives! Together, we have the power to beat this virus and keep our loved ones and neighbors safe.    For more information see CDC link below.      https://www.cdc.gov/coronavirus/2019-ncov/hcp/guidance-prevent-spread.html#precautions        Sources:  CDC, [place] Department [facility] and [facility]           Sincerely,     Kaylah Reynolds NP

## 2022-01-29 NOTE — Clinical Note
"Juanita Tran (Misty) was seen and treated in our emergency department on 1/29/2022.     COVID-19 is present in our communities across the state. There is limited testing for COVID at this time, so not all patients can be tested. In this situation, your employee meets the following criteria:    Juanita Tran has met the criteria for COVID-19 testing and has a POSITIVE result. She can return to work once they are asymptomatic for 24 hours without the use of fever reducing medications AND at least five days from the first positive result. A mask is recommended for 5 days post quarantine.     If you have any questions or concerns, or if I can be of further assistance, please do not hesitate to contact me.    Sincerely,             Kaylah Reynolds NP"

## 2022-02-06 ENCOUNTER — HOSPITAL ENCOUNTER (EMERGENCY)
Facility: HOSPITAL | Age: 42
Discharge: HOME | End: 2022-02-06
Attending: EMERGENCY MEDICINE
Payer: MEDICAID

## 2022-02-06 VITALS
WEIGHT: 271.19 LBS | OXYGEN SATURATION: 95 % | RESPIRATION RATE: 21 BRPM | TEMPERATURE: 99 F | HEIGHT: 68 IN | BODY MASS INDEX: 41.1 KG/M2 | SYSTOLIC BLOOD PRESSURE: 142 MMHG | HEART RATE: 97 BPM | DIASTOLIC BLOOD PRESSURE: 90 MMHG

## 2022-02-06 DIAGNOSIS — U07.1 COVID-19: ICD-10-CM

## 2022-02-06 DIAGNOSIS — R05.9 COUGH: Primary | ICD-10-CM

## 2022-02-06 DIAGNOSIS — R06.02 SHORTNESS OF BREATH: ICD-10-CM

## 2022-02-06 PROCEDURE — 99284 EMERGENCY DEPT VISIT MOD MDM: CPT | Mod: 25,ER

## 2022-02-06 RX ORDER — ALBUTEROL SULFATE 90 UG/1
1-2 AEROSOL, METERED RESPIRATORY (INHALATION) EVERY 6 HOURS PRN
Qty: 8 G | Refills: 0 | Status: SHIPPED | OUTPATIENT
Start: 2022-02-06 | End: 2023-02-06

## 2022-02-06 RX ORDER — IBUPROFEN 800 MG/1
800 TABLET ORAL EVERY 6 HOURS PRN
Qty: 20 TABLET | Refills: 0 | Status: SHIPPED | OUTPATIENT
Start: 2022-02-06

## 2022-02-06 RX ORDER — CODEINE PHOSPHATE AND GUAIFENESIN 10; 100 MG/5ML; MG/5ML
5 SOLUTION ORAL 3 TIMES DAILY PRN
Qty: 180 ML | Refills: 0 | Status: SHIPPED | OUTPATIENT
Start: 2022-02-06 | End: 2022-02-16

## 2022-02-06 NOTE — Clinical Note
"Juanita Tran (Misty) was seen and treated in our emergency department on 2/6/2022.     COVID-19 is present in our communities across the state. There is limited testing for COVID at this time, so not all patients can be tested. In this situation, your employee meets the following criteria:    Juanita Tran has met the criteria for COVID-19 testing and has a POSITIVE result. She can return to work once they are asymptomatic for 24 hours without the use of fever reducing medications AND at least five days from the first positive result. A mask is recommended for 5 days post quarantine.     If you have any questions or concerns, or if I can be of further assistance, please do not hesitate to contact me.    Sincerely,             Bud Ray NP"

## 2022-02-07 NOTE — ED PROVIDER NOTES
Encounter Date: 2/6/2022       History     Chief Complaint   Patient presents with    COVID-19 Concerns     Pt present to ED with concerns of worsening Covid symptoms, excessive coughing, trouble breathing upon coughing, positive 1/29     Patient is a 41-year-old female presents with complaints of worsening COVID symptoms.  Patient states she was diagnosed with COVID on January 29th and states her symptoms have worsened after an initial improvement.  She is complaining of cough, shortness of breath.  States she has been taking over-the-counter Mucinex and ibuprofen without any relief.  No distress noted this time.        Review of patient's allergies indicates:   Allergen Reactions    Azithromycin Swelling    Sulfa (sulfonamide antibiotics) Swelling    Imitrex [sumatriptan]     Penicillins     Prednisone      Past Medical History:   Diagnosis Date    Environmental allergies     Fibromyalgia     GERD (gastroesophageal reflux disease)     Insulin resistance     Osteoarthritis     Rheumatoid arteritis     Sciatica      Past Surgical History:   Procedure Laterality Date    ECTOPIC PREGNANCY SURGERY      WISDOM TOOTH EXTRACTION       Family History   Problem Relation Age of Onset    Thrombosis Maternal Grandmother         DVT    Colon cancer Maternal Grandfather     Thrombosis Mother         DVT    Breast cancer Neg Hx     Ovarian cancer Neg Hx      Social History     Tobacco Use    Smoking status: Current Every Day Smoker     Packs/day: 1.00     Years: 20.00     Pack years: 20.00     Types: Cigarettes    Smokeless tobacco: Never Used   Substance Use Topics    Alcohol use: Yes     Comment: rarely    Drug use: No     Review of Systems   Constitutional: Negative for fever.   HENT: Negative for sore throat.    Respiratory: Positive for cough and shortness of breath.    Cardiovascular: Negative for chest pain.   Gastrointestinal: Negative for nausea.   Genitourinary: Negative for dysuria.    Musculoskeletal: Negative for back pain.   Skin: Negative for rash.   Neurological: Negative for weakness.   Hematological: Does not bruise/bleed easily.       Physical Exam     Initial Vitals [02/06/22 1646]   BP Pulse Resp Temp SpO2   (!) 147/93 105 (!) 22 98.6 °F (37 °C) (!) 94 %      MAP       --         Physical Exam    Nursing note and vitals reviewed.  Constitutional: She appears well-developed and well-nourished.   HENT:   Head: Normocephalic and atraumatic.   Eyes: EOM are normal. Pupils are equal, round, and reactive to light.   Neck: Neck supple.   Normal range of motion.  Cardiovascular: Normal rate, regular rhythm, normal heart sounds and intact distal pulses.   Pulmonary/Chest: She has wheezes (Bilateral).   Abdominal: Abdomen is soft. Bowel sounds are normal.   Musculoskeletal:         General: Normal range of motion.      Cervical back: Normal range of motion and neck supple.     Neurological: She is alert and oriented to person, place, and time. She has normal strength and normal reflexes.   Skin: Skin is warm and dry.         ED Course   Procedures  Labs Reviewed   HIV 1 / 2 ANTIBODY   HEPATITIS C ANTIBODY   HEP C VIRUS HOLD SPECIMEN          Imaging Results          X-Ray Chest PA And Lateral (Final result)  Result time 02/06/22 17:51:58    Final result by Taras Dumont MD (02/06/22 17:51:58)                 Impression:      No acute process seen      Electronically signed by: Tim Grajeda  Date:    02/06/2022  Time:    17:51             Narrative:    EXAMINATION:  XR CHEST PA AND LATERAL    CLINICAL HISTORY:  Shortness of breath    TECHNIQUE:  PA and lateral views of the chest were performed.    COMPARISON:  None    FINDINGS:  Lungs are clear.  No acute osseous injury.  Cardiac silhouette within normal limits.                                 Medications - No data to display  Medical Decision Making:   ED Management:  Patient informed of imaging results.  Patient instructed take medications as  prescribed under follow-up with her primary care physician.  No distress noted at time of discharge.                      Clinical Impression:   Final diagnoses:  [R06.02] Shortness of breath  [R05.9] Cough (Primary)  [U07.1] COVID-19          ED Disposition Condition    Discharge Stable        ED Prescriptions     Medication Sig Dispense Start Date End Date Auth. Provider    ibuprofen (ADVIL,MOTRIN) 800 MG tablet Take 1 tablet (800 mg total) by mouth every 6 (six) hours as needed for Pain. 20 tablet 2/6/2022  Bud Ray NP    guaiFENesin-codeine 100-10 mg/5 ml (CHERATUSSIN AC)  mg/5 mL syrup Take 5 mLs by mouth 3 (three) times daily as needed. 180 mL 2/6/2022 2/16/2022 Bud Ray NP    albuterol (PROVENTIL/VENTOLIN HFA) 90 mcg/actuation inhaler Inhale 1-2 puffs into the lungs every 6 (six) hours as needed for Wheezing. Rescue 8 g 2/6/2022 2/6/2023 Bud Ray NP        Follow-up Information     Follow up With Specialties Details Why Contact Info    [facility]  As needed [address]  [facility] [place] [address] [phone]             Bud Ray NP  02/06/22 2640

## 2022-05-18 ENCOUNTER — HOSPITAL ENCOUNTER (EMERGENCY)
Facility: HOSPITAL | Age: 42
Discharge: HOME | End: 2022-05-18
Attending: EMERGENCY MEDICINE
Payer: MEDICAID

## 2022-05-18 VITALS
DIASTOLIC BLOOD PRESSURE: 72 MMHG | BODY MASS INDEX: 42.66 KG/M2 | OXYGEN SATURATION: 97 % | RESPIRATION RATE: 20 BRPM | TEMPERATURE: 98 F | HEIGHT: 68 IN | SYSTOLIC BLOOD PRESSURE: 141 MMHG | HEART RATE: 85 BPM | WEIGHT: 281.5 LBS

## 2022-05-18 DIAGNOSIS — M54.50 LOW BACK PAIN, UNSPECIFIED BACK PAIN LATERALITY, UNSPECIFIED CHRONICITY, UNSPECIFIED WHETHER SCIATICA PRESENT: Primary | ICD-10-CM

## 2022-05-18 PROCEDURE — 99284 EMERGENCY DEPT VISIT MOD MDM: CPT | Mod: 25,ER

## 2022-05-18 RX ORDER — CYCLOBENZAPRINE HCL 10 MG
10 TABLET ORAL 3 TIMES DAILY PRN
Qty: 15 TABLET | Refills: 0 | Status: SHIPPED | OUTPATIENT
Start: 2022-05-18 | End: 2022-05-23

## 2022-05-18 RX ORDER — NAPROXEN 500 MG/1
500 TABLET ORAL 2 TIMES DAILY WITH MEALS
Qty: 10 TABLET | Refills: 0 | Status: SHIPPED | OUTPATIENT
Start: 2022-05-18 | End: 2022-05-23

## 2022-05-18 NOTE — ED PROVIDER NOTES
"Encounter Date: 5/18/2022       History     Chief Complaint   Patient presents with    Back Pain     Pt present to ED with concerns of lower back pain, onset Saturday after lifting a bag when she heard a pot and felt a pull     Patient presents to ER for low back pain, onset 4 days ago after picking up on a Tote bag.  Patient states after she lifted the tote bag, she twisted at the waist and then began having low bag pain. She has taken a family member's dose of "pain medication" which provided some relief. Pain is worse with movement of lower back. Pain is relieved by rest. She denies numbness/tingling, saddle anesthesia, bladder/bowel dysfunction, dysuria, fever.    The history is provided by the patient.     Review of patient's allergies indicates:   Allergen Reactions    Azithromycin Swelling    Sulfa (sulfonamide antibiotics) Swelling    Imitrex [sumatriptan]     Penicillins     Prednisone      Past Medical History:   Diagnosis Date    Environmental allergies     Fibromyalgia     GERD (gastroesophageal reflux disease)     Insulin resistance     Osteoarthritis     Rheumatoid arteritis     Sciatica      Past Surgical History:   Procedure Laterality Date    ECTOPIC PREGNANCY SURGERY      WISDOM TOOTH EXTRACTION       Family History   Problem Relation Age of Onset    Thrombosis Maternal Grandmother         DVT    Colon cancer Maternal Grandfather     Thrombosis Mother         DVT    Breast cancer Neg Hx     Ovarian cancer Neg Hx      Social History     Tobacco Use    Smoking status: Current Every Day Smoker     Packs/day: 1.00     Years: 20.00     Pack years: 20.00     Types: Cigarettes    Smokeless tobacco: Never Used   Substance Use Topics    Alcohol use: Yes     Comment: rarely    Drug use: No     Review of Systems   Constitutional: Negative for chills, fatigue and fever.   HENT: Negative for ear pain, rhinorrhea, sinus pressure, sinus pain and sore throat.    Eyes: Negative for pain. "   Respiratory: Negative for cough and shortness of breath.    Cardiovascular: Negative for chest pain and palpitations.   Gastrointestinal: Negative for abdominal pain, constipation, diarrhea, nausea and vomiting.   Genitourinary: Negative for dysuria and flank pain.   Musculoskeletal: Positive for back pain. Negative for myalgias and neck pain.   Skin: Negative for rash.   Neurological: Negative for weakness, numbness and headaches.   All other systems reviewed and are negative.      Physical Exam     Initial Vitals [05/18/22 1437]   BP Pulse Resp Temp SpO2   (!) 141/72 85 20 98.2 °F (36.8 °C) 97 %      MAP       --         Physical Exam    Nursing note and vitals reviewed.  Constitutional: She appears well-developed and well-nourished. She is not diaphoretic. No distress.   HENT:   Head: Normocephalic and atraumatic.   Eyes: Conjunctivae and EOM are normal. Pupils are equal, round, and reactive to light.   Neck: Neck supple.   Normal range of motion.  Cardiovascular: Normal rate, regular rhythm and intact distal pulses.   Pulmonary/Chest: Breath sounds normal. No respiratory distress.   Abdominal: Abdomen is soft. There is no abdominal tenderness.   Musculoskeletal:         General: Normal range of motion.      Cervical back: Normal, normal range of motion and neck supple.      Thoracic back: Normal.      Lumbar back: Tenderness present. No swelling, edema or deformity. Normal range of motion.        Back:      Neurological: She is alert and oriented to person, place, and time. She has normal strength. No sensory deficit. GCS score is 15. GCS eye subscore is 4. GCS verbal subscore is 5. GCS motor subscore is 6.   Skin: Skin is warm and dry. Capillary refill takes less than 2 seconds.         ED Course   Procedures  Labs Reviewed - No data to display       Imaging Results          X-Ray Lumbar Spine Ap And Lateral (Final result)  Result time 05/18/22 15:43:14    Final result by López Palomino MD (05/18/22  15:43:14)                 Impression:      See above.      Electronically signed by: López Palomino MD  Date:    05/18/2022  Time:    15:43             Narrative:    EXAMINATION:  XR LUMBAR SPINE AP AND LATERAL    CLINICAL HISTORY:  back pain;    COMPARISON:  None    FINDINGS:  3 views of the lumbar spine.    Mild degenerative changes including facet arthropathy and spondylosis within the lower lumbar spine.  Overall alignment is well maintained.  No evidence of spondylolysis or spondylolisthesis. Disc space narrowing noted L4/5 at L5/S1.  Vertebral body heights are normal.    Mild constipation.                                 Medications - No data to display                discussed results with patient and she verbalized understanding with no further questions or concerns.  Patient states comfortable with discharge home.  Discussed follow-up with PCP.  Discussed signs symptoms to return to ER.    I discussed with patient  that evaluation in the ED does not suggest any emergent or life threatening medical conditions requiring immediate intervention beyond what was provided in the ED, and I believe patient is safe for discharge. Regardless, an unremarkable evaluation in the ED does not preclude the development or presence of a serious of life threatening condition. As such, patient was instructed to return immediately for any worsening or change in current symptoms.      Clinical Impression:   Final diagnoses:  [M54.50] Low back pain, unspecified back pain laterality, unspecified chronicity, unspecified whether sciatica present (Primary)          ED Disposition Condition    Discharge Stable        ED Prescriptions     Medication Sig Dispense Start Date End Date Auth. Provider    naproxen (NAPROSYN) 500 MG tablet Take 1 tablet (500 mg total) by mouth 2 (two) times daily with meals. for 5 days 10 tablet 5/18/2022 5/23/2022 Paul Milian NP    cyclobenzaprine (FLEXERIL) 10 MG tablet Take 1 tablet (10 mg total) by  mouth 3 (three) times daily as needed for Muscle spasms. 15 tablet 5/18/2022 5/23/2022 Paul Milian NP        Follow-up Information     Follow up With Specialties Details Why Contact [facility] [place]  In 2 days  [address]  [place] [address]  [phone]      [facility] - Emergency Dept Emergency Medicine  As needed, If symptoms worsen [address]  [place] [id number] [phone]           Paul Milian NP  05/19/22 2206

## 2022-05-18 NOTE — Clinical Note
"Juanita "Juanita" Chelsea was seen and treated in our emergency department on 5/18/2022.  She may return to work on 05/19/2022.       If you have any questions or concerns, please don't hesitate to call.       RN    "

## 2024-04-03 ENCOUNTER — PATIENT MESSAGE (OUTPATIENT)
Dept: PULMONOLOGY | Facility: CLINIC | Age: 44
End: 2024-04-03
Payer: MEDICAID

## 2024-07-11 ENCOUNTER — HOSPITAL ENCOUNTER (EMERGENCY)
Facility: HOSPITAL | Age: 44
Discharge: HOME | End: 2024-07-11
Attending: STUDENT IN AN ORGANIZED HEALTH CARE EDUCATION/TRAINING PROGRAM

## 2024-07-11 VITALS
HEART RATE: 110 BPM | WEIGHT: 293 LBS | BODY MASS INDEX: 44.41 KG/M2 | RESPIRATION RATE: 20 BRPM | DIASTOLIC BLOOD PRESSURE: 57 MMHG | TEMPERATURE: 97 F | HEIGHT: 68 IN | OXYGEN SATURATION: 93 % | SYSTOLIC BLOOD PRESSURE: 114 MMHG

## 2024-07-11 DIAGNOSIS — R06.02 SHORTNESS OF BREATH: ICD-10-CM

## 2024-07-11 DIAGNOSIS — R06.2 WHEEZING: Primary | ICD-10-CM

## 2024-07-11 DIAGNOSIS — J44.1 COPD EXACERBATION: ICD-10-CM

## 2024-07-11 LAB
ALBUMIN SERPL BCP-MCNC: 3.6 G/DL (ref 3.5–5.2)
ALP SERPL-CCNC: 92 U/L (ref 55–135)
ALT SERPL W/O P-5'-P-CCNC: 21 U/L (ref 10–44)
ANION GAP SERPL CALC-SCNC: 14 MMOL/L (ref 8–16)
AST SERPL-CCNC: 21 U/L (ref 10–40)
BASOPHILS # BLD AUTO: 0.03 K/UL (ref 0–0.2)
BASOPHILS NFR BLD: 0.4 % (ref 0–1.9)
BILIRUB SERPL-MCNC: 0.4 MG/DL (ref 0.1–1)
BUN SERPL-MCNC: 10 MG/DL (ref 6–20)
CALCIUM SERPL-MCNC: 9.2 MG/DL (ref 8.7–10.5)
CHLORIDE SERPL-SCNC: 105 MMOL/L (ref 95–110)
CO2 SERPL-SCNC: 20 MMOL/L (ref 23–29)
CREAT SERPL-MCNC: 0.7 MG/DL (ref 0.5–1.4)
CTP QC/QA: YES
CTP QC/QA: YES
D DIMER PPP IA.FEU-MCNC: 0.57 MG/L FEU
DIFFERENTIAL METHOD BLD: ABNORMAL
EOSINOPHIL # BLD AUTO: 0.2 K/UL (ref 0–0.5)
EOSINOPHIL NFR BLD: 2.6 % (ref 0–8)
ERYTHROCYTE [DISTWIDTH] IN BLOOD BY AUTOMATED COUNT: 14 % (ref 11.5–14.5)
EST. GFR  (NO RACE VARIABLE): >60 ML/MIN/1.73 M^2
GLUCOSE SERPL-MCNC: 92 MG/DL (ref 70–110)
HCT VFR BLD AUTO: 45.9 % (ref 37–48.5)
HGB BLD-MCNC: 14.9 G/DL (ref 12–16)
IMM GRANULOCYTES # BLD AUTO: 0.03 K/UL (ref 0–0.04)
IMM GRANULOCYTES NFR BLD AUTO: 0.4 % (ref 0–0.5)
LYMPHOCYTES # BLD AUTO: 2.3 K/UL (ref 1–4.8)
LYMPHOCYTES NFR BLD: 26.5 % (ref 18–48)
MCH RBC QN AUTO: 27.5 PG (ref 27–31)
MCHC RBC AUTO-ENTMCNC: 32.5 G/DL (ref 32–36)
MCV RBC AUTO: 85 FL (ref 82–98)
MONOCYTES # BLD AUTO: 0.4 K/UL (ref 0.3–1)
MONOCYTES NFR BLD: 4.8 % (ref 4–15)
NEUTROPHILS # BLD AUTO: 5.5 K/UL (ref 1.8–7.7)
NEUTROPHILS NFR BLD: 65.3 % (ref 38–73)
NRBC BLD-RTO: 0 /100 WBC
PLATELET # BLD AUTO: 250 K/UL (ref 150–450)
PMV BLD AUTO: 11.4 FL (ref 9.2–12.9)
POC MOLECULAR INFLUENZA A AGN: NEGATIVE
POC MOLECULAR INFLUENZA B AGN: NEGATIVE
POTASSIUM SERPL-SCNC: 4.3 MMOL/L (ref 3.5–5.1)
PROT SERPL-MCNC: 7.7 G/DL (ref 6–8.4)
RBC # BLD AUTO: 5.42 M/UL (ref 4–5.4)
SARS-COV-2 RDRP RESP QL NAA+PROBE: NEGATIVE
SODIUM SERPL-SCNC: 139 MMOL/L (ref 136–145)
TROPONIN I SERPL DL<=0.01 NG/ML-MCNC: 0.02 NG/ML (ref 0–0.03)
WBC # BLD AUTO: 8.48 K/UL (ref 3.9–12.7)

## 2024-07-11 PROCEDURE — 93005 ELECTROCARDIOGRAM TRACING: CPT | Mod: ER

## 2024-07-11 PROCEDURE — 84484 ASSAY OF TROPONIN QUANT: CPT | Mod: ER | Performed by: STUDENT IN AN ORGANIZED HEALTH CARE EDUCATION/TRAINING PROGRAM

## 2024-07-11 PROCEDURE — 93010 ELECTROCARDIOGRAM REPORT: CPT | Mod: ,,, | Performed by: INTERNAL MEDICINE

## 2024-07-11 PROCEDURE — 80053 COMPREHEN METABOLIC PANEL: CPT | Mod: ER | Performed by: STUDENT IN AN ORGANIZED HEALTH CARE EDUCATION/TRAINING PROGRAM

## 2024-07-11 PROCEDURE — 85025 COMPLETE CBC W/AUTO DIFF WBC: CPT | Mod: ER | Performed by: STUDENT IN AN ORGANIZED HEALTH CARE EDUCATION/TRAINING PROGRAM

## 2024-07-11 PROCEDURE — 85379 FIBRIN DEGRADATION QUANT: CPT | Mod: ER | Performed by: STUDENT IN AN ORGANIZED HEALTH CARE EDUCATION/TRAINING PROGRAM

## 2024-07-11 PROCEDURE — 96374 THER/PROPH/DIAG INJ IV PUSH: CPT | Mod: ER

## 2024-07-11 PROCEDURE — 94640 AIRWAY INHALATION TREATMENT: CPT | Mod: ER

## 2024-07-11 PROCEDURE — 86803 HEPATITIS C AB TEST: CPT | Performed by: EMERGENCY MEDICINE

## 2024-07-11 PROCEDURE — 25000242 PHARM REV CODE 250 ALT 637 W/ HCPCS: Mod: ER | Performed by: STUDENT IN AN ORGANIZED HEALTH CARE EDUCATION/TRAINING PROGRAM

## 2024-07-11 PROCEDURE — 99285 EMERGENCY DEPT VISIT HI MDM: CPT | Mod: 25,ER

## 2024-07-11 PROCEDURE — 63600175 PHARM REV CODE 636 W HCPCS: Mod: ER | Performed by: STUDENT IN AN ORGANIZED HEALTH CARE EDUCATION/TRAINING PROGRAM

## 2024-07-11 PROCEDURE — 87389 HIV-1 AG W/HIV-1&-2 AB AG IA: CPT | Performed by: EMERGENCY MEDICINE

## 2024-07-11 PROCEDURE — 87635 SARS-COV-2 COVID-19 AMP PRB: CPT | Mod: ER | Performed by: STUDENT IN AN ORGANIZED HEALTH CARE EDUCATION/TRAINING PROGRAM

## 2024-07-11 PROCEDURE — 94761 N-INVAS EAR/PLS OXIMETRY MLT: CPT | Mod: ER

## 2024-07-11 PROCEDURE — 87502 INFLUENZA DNA AMP PROBE: CPT | Mod: ER

## 2024-07-11 RX ORDER — IPRATROPIUM BROMIDE 0.5 MG/2.5ML
1 SOLUTION RESPIRATORY (INHALATION)
Status: COMPLETED | OUTPATIENT
Start: 2024-07-11 | End: 2024-07-11

## 2024-07-11 RX ORDER — ALBUTEROL SULFATE 0.83 MG/ML
15 SOLUTION RESPIRATORY (INHALATION)
Status: DISCONTINUED | OUTPATIENT
Start: 2024-07-11 | End: 2024-07-11

## 2024-07-11 RX ORDER — IPRATROPIUM BROMIDE 0.5 MG/2.5ML
SOLUTION RESPIRATORY (INHALATION)
Status: DISCONTINUED
Start: 2024-07-11 | End: 2024-07-12 | Stop reason: HOSPADM

## 2024-07-11 RX ORDER — ALBUTEROL SULFATE 0.83 MG/ML
10 SOLUTION RESPIRATORY (INHALATION)
Status: COMPLETED | OUTPATIENT
Start: 2024-07-11 | End: 2024-07-11

## 2024-07-11 RX ORDER — DOXYCYCLINE 100 MG/1
100 CAPSULE ORAL 2 TIMES DAILY
Qty: 10 CAPSULE | Refills: 0 | Status: SHIPPED | OUTPATIENT
Start: 2024-07-11 | End: 2024-07-16

## 2024-07-11 RX ORDER — IPRATROPIUM BROMIDE AND ALBUTEROL SULFATE 2.5; .5 MG/3ML; MG/3ML
3 SOLUTION RESPIRATORY (INHALATION) ONCE
Status: COMPLETED | OUTPATIENT
Start: 2024-07-12 | End: 2024-07-11

## 2024-07-11 RX ORDER — PREDNISONE 50 MG/1
50 TABLET ORAL DAILY
Qty: 10 TABLET | Refills: 0 | Status: SHIPPED | OUTPATIENT
Start: 2024-07-11

## 2024-07-11 RX ORDER — ALBUTEROL SULFATE 90 UG/1
1-2 AEROSOL, METERED RESPIRATORY (INHALATION) EVERY 6 HOURS PRN
Qty: 6.7 G | Refills: 0 | Status: SHIPPED | OUTPATIENT
Start: 2024-07-11 | End: 2025-07-11

## 2024-07-11 RX ORDER — METHYLPREDNISOLONE SOD SUCC 125 MG
125 VIAL (EA) INJECTION
Status: COMPLETED | OUTPATIENT
Start: 2024-07-11 | End: 2024-07-11

## 2024-07-11 RX ADMIN — METHYLPREDNISOLONE SODIUM SUCCINATE 125 MG: 125 INJECTION, POWDER, FOR SOLUTION INTRAMUSCULAR; INTRAVENOUS at 10:07

## 2024-07-11 RX ADMIN — IPRATROPIUM BROMIDE 1 MG: 0.5 SOLUTION RESPIRATORY (INHALATION) at 09:07

## 2024-07-11 RX ADMIN — ALBUTEROL SULFATE 10 MG: 2.5 SOLUTION RESPIRATORY (INHALATION) at 09:07

## 2024-07-11 RX ADMIN — IPRATROPIUM BROMIDE AND ALBUTEROL SULFATE 3 ML: .5; 3 SOLUTION RESPIRATORY (INHALATION) at 11:07

## 2024-07-11 NOTE — Clinical Note
"Juanita "Juanita" Chelsea was seen and treated in our emergency department on 7/11/2024.  She may return to work on 07/15/2024.       If you have any questions or concerns, please don't hesitate to call.      Fannie Jalloh RN    "

## 2024-07-12 LAB
HCV AB SERPL QL IA: NORMAL
HEP C VIRUS HOLD SPECIMEN: NORMAL
HIV 1+2 AB+HIV1 P24 AG SERPL QL IA: NORMAL
OHS QRS DURATION: 80 MS
OHS QTC CALCULATION: 439 MS

## 2024-07-12 NOTE — ED PROVIDER NOTES
Encounter Date: 7/11/2024       History     Chief Complaint   Patient presents with    Shortness of Breath     Cough over the last week and pt now feeling SOB with chest pain. Denies cardiac or respiratory hx.      44-year-old female comes to the emergency department due to chest tightness.  She states over the past couple of days she has had difficulty when she gets up to walk, she feels like her chest is tight and she can not take a deep breath.  She states that about July 4th, she developed a viral illness consisting of congestion, rhinorrhea, sore throat, cough, most of the symptoms have gone away, but her chest tightness has not.  She does smoke, states she has not smoked in about a week because of the chest tightness.  She has no true chest pain.  She has no abdominal symptoms.  No fevers or chills.  She has never been diagnosed with asthma, but she has smoked since she was about 16 years old.        Review of patient's allergies indicates:   Allergen Reactions    Azithromycin Swelling    Sulfa (sulfonamide antibiotics) Swelling    Imitrex [sumatriptan]     Penicillins     Prednisone      Past Medical History:   Diagnosis Date    Environmental allergies     Fibromyalgia     GERD (gastroesophageal reflux disease)     Insulin resistance     Osteoarthritis     Rheumatoid arteritis     Sciatica      Past Surgical History:   Procedure Laterality Date    ECTOPIC PREGNANCY SURGERY      WISDOM TOOTH EXTRACTION       Family History   Problem Relation Name Age of Onset    Thrombosis Maternal Grandmother          DVT    Colon cancer Maternal Grandfather      Thrombosis Mother          DVT    Breast cancer Neg Hx      Ovarian cancer Neg Hx       Social History     Tobacco Use    Smoking status: Every Day     Current packs/day: 1.00     Average packs/day: 1 pack/day for 20.0 years (20.0 ttl pk-yrs)     Types: Cigarettes    Smokeless tobacco: Never   Substance Use Topics    Alcohol use: Yes     Comment: rarely    Drug use:  No     Review of Systems   Constitutional:  Negative for fever.   HENT:  Negative for sore throat.    Respiratory:  Positive for cough, chest tightness and shortness of breath.    Cardiovascular:  Negative for chest pain.   Gastrointestinal:  Negative for nausea.   Genitourinary:  Negative for difficulty urinating, dysuria, flank pain, frequency and urgency.   Musculoskeletal:  Negative for back pain and neck pain.   Skin:  Negative for rash and wound.   Neurological:  Negative for dizziness, seizures, weakness, light-headedness, numbness and headaches.   Hematological:  Does not bruise/bleed easily.       Physical Exam     Initial Vitals [07/11/24 2018]   BP Pulse Resp Temp SpO2   (!) 179/105 84 (!) 26 96.7 °F (35.9 °C) (!) 94 %      MAP       --         Physical Exam  Nursing Notes and Vital Signs Reviewed.  Constitutional: Patient is in mild respiratory distress. Well-developed and well-nourished.  Head: Atraumatic. Normocephalic.  Eyes:  EOM intact.  No scleral icterus.  ENT: Mucous membranes are moist.  Nares clear   Neck:  Full ROM. No JVD.  Cardiovascular: Regular rate. Regular rhythm No murmurs, rubs, or gallops. Distal pulses are 2+ and symmetric  Pulmonary/Chest:  Diffuse wheezing bilaterally.  Minimal air movement.  Bilateral Rhonchi.  Equal chest wall rise bilaterally  Abdominal: Soft and non-distended.  There is no tenderness.  No rebound, guarding, or rigidity.   Musculoskeletal: Moves all extremities. No obvious deformities.  5 x 5 strength in all extremities   Skin: Warm and dry.  Neurological:  Alert, awake, and appropriate.  Normal speech.  No acute focal neurological deficits are appreciated.  Two through 12 intact bilaterally.    ED Course   Critical Care    Date/Time: 7/12/2024 11:34 PM    Performed by: Kofi Barrientos MD  Authorized by: Kofi Barrientos MD  Direct patient critical care time: 19 minutes  Additional history critical care time: 7 minutes  Ordering / reviewing critical care  time: 12 minutes  Documentation critical care time: 7 minutes  Consulting other physicians critical care time: 0 minutes  Consult with family critical care time: 0 minutes  Other critical care time: 0 minutes  Total critical care time (exclusive of procedural time) : 45 minutes  Critical care time was exclusive of separately billable procedures and treating other patients and teaching time.  Critical care was necessary to treat or prevent imminent or life-threatening deterioration of the following conditions: COPD exacerbation with respiratory distress requiring two large volume breathing tx and solumedrol.  Critical care was time spent personally by me on the following activities: blood draw for specimens, development of treatment plan with patient or surrogate, discussions with consultants, interpretation of cardiac output measurements, evaluation of patient's response to treatment, examination of patient, obtaining history from patient or surrogate, ordering and performing treatments and interventions, ordering and review of laboratory studies, ordering and review of radiographic studies, pulse oximetry, re-evaluation of patient's condition and review of old charts.        Labs Reviewed   CBC W/ AUTO DIFFERENTIAL - Abnormal; Notable for the following components:       Result Value    RBC 5.42 (*)     All other components within normal limits   COMPREHENSIVE METABOLIC PANEL - Abnormal; Notable for the following components:    CO2 20 (*)     All other components within normal limits   D DIMER, QUANTITATIVE - Abnormal; Notable for the following components:    D-Dimer 0.57 (*)     All other components within normal limits   HIV 1 / 2 ANTIBODY    Narrative:     Release to patient->Immediate   HEPATITIS C ANTIBODY    Narrative:     Release to patient->Immediate   HEP C VIRUS HOLD SPECIMEN    Narrative:     Release to patient->Immediate   TROPONIN I   SARS-COV-2 RDRP GENE   POCT INFLUENZA A/B MOLECULAR     EKG Readings:  (Independently Interpreted)   My independent interpretation of the patient's EKG demonstrates normal sinus rhythm at 83 beats per minute.  No ischemic changes present.  No ST elevation.  Intervals appropriate.       Imaging Results              X-Ray Chest AP Portable (Final result)  Result time 07/11/24 22:45:18      Final result by Paulina Crouch MD (07/11/24 22:45:18)                   Impression:      No acute abnormality.      Electronically signed by: Paulina Crouch  Date:    07/11/2024  Time:    22:45               Narrative:    EXAMINATION:  XR CHEST AP PORTABLE    CLINICAL HISTORY:  shortness of breath;.    TECHNIQUE:  Single frontal portable view of the chest was performed.    COMPARISON:  February 2022    FINDINGS:  Support devices: None    The lungs are clear, with normal appearance of pulmonary vasculature and no pleural effusion or pneumothorax.                                       Medications   ipratropium 0.02 % nebulizer solution 1 mg (1 mg Nebulization Given 7/11/24 2125)   albuterol nebulizer solution 10 mg (10 mg Nebulization Given 7/11/24 2125)   methylPREDNISolone sodium succinate injection 125 mg (125 mg Intravenous Given 7/11/24 2240)   albuterol-ipratropium 2.5 mg-0.5 mg/3 mL nebulizer solution 3 mL (3 mLs Nebulization Given 7/11/24 2305)     Medical Decision Making  Differential diagnoses:  pneumonia, congestive heart failure, acute myocardial infarction, pleural effusion, pulmonary edema, pulmonary embolism, electrolyte imbalance, infectious etiology, COPD exacerbation, asthma exacerbation, pneumothorax, anemia, among others.    Differential as above.  PE considered, especially with elevated dimer, however physical exam suggest a PE is not my leading diagnosis, and patient has not had hemoptysis or leg swelling.  Using the years algorithm, PE can be excluded in this patient.  ACS considered, troponin negative.  Patient with diffuse wheezing on exam, this was improved with  breathing treatments.  Patient given subsequent breathing treatment as there are no pharmacies open at this time a night.  Repeat respiratory exam prior to discharge demonstrates some rhonchi in the lower lobes, but no more wheezing.  Patient reports significant improvement.  We will discharge home on prednisone.  Patient states that she had a bad reaction to prednisone back in her 20s, however, she would like to try a course and we will stop taking it if she gets similar symptoms.  The symptoms at that time were anxiety and jitteriness.  Patient to follow up with pulmonology.    Amount and/or Complexity of Data Reviewed  External Data Reviewed: notes.     Details: Reviewed an external note from 11/01/2021 with the patient's PCP documenting their chronic medical problems, outpatient treatment plans, and provider recommendations. This note affected my medical decision making and disposition planning today.     Labs: ordered. Decision-making details documented in ED Course.  Radiology: ordered and independent interpretation performed. Decision-making details documented in ED Course.    Risk  OTC drugs.  Prescription drug management.  Decision regarding hospitalization.  Risk Details: I did consider hospitalization, especially if the patient would not have improve with the breathing treatments.               ED Course as of 07/12/24 0430   Thu Jul 11, 2024 2141 Troponin I: 0.016 [MC]   2149 D-Dimer(!): 0.57  Years criteria negative.  Patient has had no hemoptysis, no unilateral leg swelling, and I do not believe that PE is my most likely diagnosis given the diffuse wheezing on exam, recent viral illness, and lack of risk factors otherwise. [MC]   2150 X-Ray Chest AP Portable  My independent interpretation reveals no acute focal consolidation, effusion, or pneumothorax.   []   2150 Cardiac Monitor Interpretation:  This is my independent interpretation.   Rate: 89  Rhythm: NSR  Indication: shortness of breath   [MC]    2220 Patient feels remarkably better after the breathing treatment.  D-dimer elevated, especially with the improvement in breathing, I would highly doubt PE in this case. [MC]      ED Course User Index  [MC] Kofi Barrientos MD                           Clinical Impression:  Final diagnoses:  [R06.02] Shortness of breath  [R06.2] Wheezing (Primary)          ED Disposition Condition    Discharge Stable          ED Prescriptions       Medication Sig Dispense Start Date End Date Auth. Provider    predniSONE (DELTASONE) 50 MG Tab Take 1 tablet (50 mg total) by mouth once daily. 10 tablet 7/11/2024 -- Kofi Barrientos MD    albuterol (PROVENTIL/VENTOLIN HFA) 90 mcg/actuation inhaler Inhale 1-2 puffs into the lungs every 6 (six) hours as needed for Wheezing. Rescue 6.7 g 7/11/2024 7/11/2025 Kofi Barrientos MD    doxycycline (VIBRAMYCIN) 100 MG Cap Take 1 capsule (100 mg total) by mouth 2 (two) times daily. for 5 days 10 capsule 7/11/2024 7/16/2024 Kofi Barrientos MD          Follow-up Information    None          Kofi Barrientos MD  07/12/24 [id number]

## 2024-07-12 NOTE — DISCHARGE INSTRUCTIONS
Please follow-up with your PCP. Please call on the next business day to schedule this appointment.    Take the prednisone and doxycycline as prescribed for your suspected COPD exacerbation. As discussed, if you start to have any symptoms like you did many years ago, please stop taking it!     I have also given you albuterol, which is a medication that we gave you here and improved your symptoms. Please use it any time you begin to develop shortness of breath.     Return immediately for chest pain, symptoms that are uncontrollable with the medications provided, or if you have any concerns at all.